# Patient Record
Sex: FEMALE | Race: WHITE | NOT HISPANIC OR LATINO | URBAN - METROPOLITAN AREA
[De-identification: names, ages, dates, MRNs, and addresses within clinical notes are randomized per-mention and may not be internally consistent; named-entity substitution may affect disease eponyms.]

---

## 2020-04-05 ENCOUNTER — EMERGENCY (EMERGENCY)
Facility: HOSPITAL | Age: 65
LOS: 1 days | Discharge: HOME | End: 2020-04-05
Attending: EMERGENCY MEDICINE
Payer: MEDICARE

## 2020-04-05 VITALS
WEIGHT: 199.96 LBS | HEART RATE: 79 BPM | HEIGHT: 65 IN | RESPIRATION RATE: 22 BRPM | TEMPERATURE: 98 F | SYSTOLIC BLOOD PRESSURE: 174 MMHG | DIASTOLIC BLOOD PRESSURE: 73 MMHG | OXYGEN SATURATION: 98 %

## 2020-04-05 PROCEDURE — 99284 EMERGENCY DEPT VISIT MOD MDM: CPT | Mod: GC

## 2020-04-06 VITALS
RESPIRATION RATE: 20 BRPM | HEART RATE: 62 BPM | DIASTOLIC BLOOD PRESSURE: 67 MMHG | SYSTOLIC BLOOD PRESSURE: 100 MMHG | TEMPERATURE: 98 F | OXYGEN SATURATION: 96 %

## 2020-04-06 LAB
ALBUMIN SERPL ELPH-MCNC: 4.1 G/DL — SIGNIFICANT CHANGE UP (ref 3.3–5)
ALP SERPL-CCNC: 80 U/L — SIGNIFICANT CHANGE UP (ref 40–120)
ALT FLD-CCNC: 21 U/L — SIGNIFICANT CHANGE UP (ref 10–45)
ANION GAP SERPL CALC-SCNC: 15 MMOL/L — SIGNIFICANT CHANGE UP (ref 5–17)
AST SERPL-CCNC: 23 U/L — SIGNIFICANT CHANGE UP (ref 10–40)
BASOPHILS # BLD AUTO: 0.02 K/UL — SIGNIFICANT CHANGE UP (ref 0–0.2)
BASOPHILS NFR BLD AUTO: 0.2 % — SIGNIFICANT CHANGE UP (ref 0–2)
BILIRUB SERPL-MCNC: 0.3 MG/DL — SIGNIFICANT CHANGE UP (ref 0.2–1.2)
BUN SERPL-MCNC: 12 MG/DL — SIGNIFICANT CHANGE UP (ref 7–23)
CALCIUM SERPL-MCNC: 9.2 MG/DL — SIGNIFICANT CHANGE UP (ref 8.4–10.5)
CHLORIDE SERPL-SCNC: 98 MMOL/L — SIGNIFICANT CHANGE UP (ref 96–108)
CO2 SERPL-SCNC: 25 MMOL/L — SIGNIFICANT CHANGE UP (ref 22–31)
CREAT SERPL-MCNC: 0.73 MG/DL — SIGNIFICANT CHANGE UP (ref 0.5–1.3)
EOSINOPHIL # BLD AUTO: 0.03 K/UL — SIGNIFICANT CHANGE UP (ref 0–0.5)
EOSINOPHIL NFR BLD AUTO: 0.4 % — SIGNIFICANT CHANGE UP (ref 0–6)
GLUCOSE SERPL-MCNC: 116 MG/DL — HIGH (ref 70–99)
HCT VFR BLD CALC: 45.5 % — HIGH (ref 34.5–45)
HGB BLD-MCNC: 14.5 G/DL — SIGNIFICANT CHANGE UP (ref 11.5–15.5)
IMM GRANULOCYTES NFR BLD AUTO: 0.4 % — SIGNIFICANT CHANGE UP (ref 0–1.5)
LIDOCAIN IGE QN: 29 U/L — SIGNIFICANT CHANGE UP (ref 7–60)
LYMPHOCYTES # BLD AUTO: 1.98 K/UL — SIGNIFICANT CHANGE UP (ref 1–3.3)
LYMPHOCYTES # BLD AUTO: 24.5 % — SIGNIFICANT CHANGE UP (ref 13–44)
MAGNESIUM SERPL-MCNC: 2.1 MG/DL — SIGNIFICANT CHANGE UP (ref 1.6–2.6)
MCHC RBC-ENTMCNC: 27.6 PG — SIGNIFICANT CHANGE UP (ref 27–34)
MCHC RBC-ENTMCNC: 31.9 GM/DL — LOW (ref 32–36)
MCV RBC AUTO: 86.5 FL — SIGNIFICANT CHANGE UP (ref 80–100)
MONOCYTES # BLD AUTO: 0.48 K/UL — SIGNIFICANT CHANGE UP (ref 0–0.9)
MONOCYTES NFR BLD AUTO: 5.9 % — SIGNIFICANT CHANGE UP (ref 2–14)
NEUTROPHILS # BLD AUTO: 5.55 K/UL — SIGNIFICANT CHANGE UP (ref 1.8–7.4)
NEUTROPHILS NFR BLD AUTO: 68.6 % — SIGNIFICANT CHANGE UP (ref 43–77)
NRBC # BLD: 0 /100 WBCS — SIGNIFICANT CHANGE UP (ref 0–0)
PLATELET # BLD AUTO: 183 K/UL — SIGNIFICANT CHANGE UP (ref 150–400)
POTASSIUM SERPL-MCNC: 3.8 MMOL/L — SIGNIFICANT CHANGE UP (ref 3.5–5.3)
POTASSIUM SERPL-SCNC: 3.8 MMOL/L — SIGNIFICANT CHANGE UP (ref 3.5–5.3)
PROT SERPL-MCNC: 7.3 G/DL — SIGNIFICANT CHANGE UP (ref 6–8.3)
RBC # BLD: 5.26 M/UL — HIGH (ref 3.8–5.2)
RBC # FLD: 12.5 % — SIGNIFICANT CHANGE UP (ref 10.3–14.5)
SODIUM SERPL-SCNC: 138 MMOL/L — SIGNIFICANT CHANGE UP (ref 135–145)
WBC # BLD: 8.09 K/UL — SIGNIFICANT CHANGE UP (ref 3.8–10.5)
WBC # FLD AUTO: 8.09 K/UL — SIGNIFICANT CHANGE UP (ref 3.8–10.5)

## 2020-04-06 PROCEDURE — 83735 ASSAY OF MAGNESIUM: CPT

## 2020-04-06 PROCEDURE — 71045 X-RAY EXAM CHEST 1 VIEW: CPT

## 2020-04-06 PROCEDURE — 80053 COMPREHEN METABOLIC PANEL: CPT

## 2020-04-06 PROCEDURE — 93005 ELECTROCARDIOGRAM TRACING: CPT

## 2020-04-06 PROCEDURE — 85027 COMPLETE CBC AUTOMATED: CPT

## 2020-04-06 PROCEDURE — 71045 X-RAY EXAM CHEST 1 VIEW: CPT | Mod: 26

## 2020-04-06 PROCEDURE — 99283 EMERGENCY DEPT VISIT LOW MDM: CPT | Mod: 25

## 2020-04-06 PROCEDURE — 83690 ASSAY OF LIPASE: CPT

## 2020-04-06 RX ORDER — ONDANSETRON 8 MG/1
1 TABLET, FILM COATED ORAL
Qty: 15 | Refills: 0
Start: 2020-04-06 | End: 2020-04-10

## 2020-04-06 RX ADMIN — Medication 100 MILLIGRAM(S): at 00:55

## 2020-04-06 NOTE — ED PROVIDER NOTE - OBJECTIVE STATEMENT
64y f PMhx HTN, HLD, preDM not on meds, p/w cough and SOB for the last 6 days. Pt's  tested positive for COVID19 10 days ago. Pt has finished a course of azithromycin, and was started on cefdenir yesterday. Pt has been checking pulse ox at home, states today it went as low as 91% while she was lying on her back , but when sitting up is around 94% on average. Pt has been taking tylenol for fevers and body aches at home.

## 2020-04-06 NOTE — ED PROVIDER NOTE - NSFOLLOWUPINSTRUCTIONS_ED_ALL_ED_FT
YOU VERY LIKELY HAVE COVID19 INFECTION; HOWEVER, YOUR OXYGEN SATURATION IS STILL OK, THERE IS NO INDICATION FOR ADMISSION TO THE HOSPITAL AT THIS TIME    RETURN TO THE HOSPITAL FOR WORSENING SHORTNESS OF BREATH, DIZZINESS WHILE WALKING, FAINTING, OR FEELING LIKE YOU CANNOT BREATHE    PLEASE CONTINUE TAKING TYLENOL AND IBUPROFEN FOR BODY ACHES AND FEVER    PLEASE FOLLOW UP WITH YOUR PRIMARY CARE PHYSICIAN    WE ARE SENDING MEDICATION TO YOUR PHARMACY TO HELP WITH COUGH AND NAUSEA

## 2020-04-06 NOTE — ED ADULT NURSE NOTE - OBJECTIVE STATEMENT
Patient is a 64 year old female complaining of sob. Patient has history of htn, hld, pre diabetic . Patient is A&O x 4. Pt reports fever, body pain, nausea, cough. pt states her  was tested positive for covid.  Denies complaints of (chest pain, headache, syncope, burning urination, blood in urine, blood in stool. Abd is soft, tender to touch, non distended. Skin is warm and dry. Color is consistent with ethnicity. Safety and comfort maintained. Will continue to monitor.

## 2020-04-06 NOTE — ED ADULT NURSE NOTE - NSIMPLEMENTINTERV_GEN_ALL_ED
Implemented All Universal Safety Interventions:  [place] to call system. Call bell, personal items and telephone within reach. Instruct patient to call for assistance. Room bathroom lighting operational. Non-slip footwear when patient is off stretcher. Physically safe environment: no spills, clutter or unnecessary equipment. Stretcher in lowest position, wheels locked, appropriate side rails in place.

## 2020-04-06 NOTE — ED PROVIDER NOTE - ATTENDING CONTRIBUTION TO CARE
Attending MD Emily May:  I personally have seen and examined this patient.  Resident note reviewed and agree on plan of care and except where noted.  See HPI, PE, and MDM for details.

## 2020-04-06 NOTE — ED PROVIDER NOTE - PHYSICAL EXAMINATION
Attending Emily May: Gen: NAD, heent: atrauamtic, eomi, perrla, mmm, op pink, uvula midline, neck; nttp, no nuchal rigidity, chest: nttp, no crepitus, cv: rrr, no murmurs, lungs: ctab, abd: soft,mild ttp lower abdmoen, no peritoneal signs, +BS, no guarding, ext: wwp, neg homans, skin: no rash, neuro: awake and alert, following commands, speech clear, sensation and strength intact, no focal deficits

## 2020-04-06 NOTE — ED PROVIDER NOTE - PROGRESS NOTE DETAILS
Attending Emily May: pt feeling better. on repeat exam abd soft and nontender. Attending Emily May: on repeat exam ptstates feels better. abdomen soft. ambulating witbout difficulty. given close return precautons will dc

## 2020-04-06 NOTE — ED PROVIDER NOTE - PATIENT PORTAL LINK FT
You can access the FollowMyHealth Patient Portal offered by [facility] by registering at the following website: [email or web address]. By joining [employer]’s FollowMyHealth portal, you will also be able to view your health information using other applications (apps) compatible with our system.

## 2020-04-06 NOTE — ED PROVIDER NOTE - CLINICAL SUMMARY MEDICAL DECISION MAKING FREE TEXT BOX
Pt p/w cough and SOB for 6 days, pt w/ exposure to known COVID (+) pt (), pt O2 95% on RA, maintained even while walking. Pt appears fatigued from coughing but otherwise well, will sent antitussive medication and antiemetics (pt vomited once today) to pharmacy, obtain CXR, and dc pt w/out further w/u and return precautions -[facility] Pt p/w cough and SOB for 6 days, pt w/ exposure to known COVID (+) pt (), pt O2 95% on RA, maintained even while walking. Pt appears fatigued from coughing but otherwise well, mild abd pain likely 2/2 GI distress from COVID infx, nonfocal in nature, will sent antitussive medication and antiemetics (pt vomited once today) to pharmacy, obtain CXR, and dc pt w/out further w/u and return precautions -[facility] Pt p/w cough and SOB for 6 days, pt w/ exposure to known COVID (+) pt (), pt O2 95% on RA, maintained even while walking. Pt appears fatigued from coughing but otherwise well, mild abd pain likely 2/2 GI distress from COVID infx, nonfocal in nature, will sent antitussive medication and antiemetics (pt vomited once today) to pharmacy, obtain CXR, and dc pt w/out further w/u and return precautions -[facility]  Attending Emily May: 65 y/o female presentng with cough, weakness  with known covid exposure. upon arrival pt saturating 95% without increased wob. does have mild lower abd ttp. on serial exams abd soft and nontender. suspect diarrhea from covid. less likely colitis. pt ambulating without difficulty or hypoxia. will d/c with close return precautions

## 2022-07-26 PROBLEM — Z00.00 ENCOUNTER FOR PREVENTIVE HEALTH EXAMINATION: Status: ACTIVE | Noted: 2022-07-26

## 2022-07-28 ENCOUNTER — APPOINTMENT (OUTPATIENT)
Dept: ORTHOPEDIC SURGERY | Facility: CLINIC | Age: 67
End: 2022-07-28

## 2022-07-28 VITALS
BODY MASS INDEX: 34.66 KG/M2 | SYSTOLIC BLOOD PRESSURE: 148 MMHG | HEART RATE: 67 BPM | WEIGHT: 208 LBS | HEIGHT: 65 IN | DIASTOLIC BLOOD PRESSURE: 83 MMHG

## 2022-07-28 DIAGNOSIS — Z78.9 OTHER SPECIFIED HEALTH STATUS: ICD-10-CM

## 2022-07-28 DIAGNOSIS — M25.531 PAIN IN RIGHT WRIST: ICD-10-CM

## 2022-07-28 PROCEDURE — 99203 OFFICE O/P NEW LOW 30 MIN: CPT

## 2022-07-30 PROBLEM — Z78.9 CURRENT NON-DRINKER OF ALCOHOL: Status: ACTIVE | Noted: 2022-07-28

## 2022-07-30 PROBLEM — Z78.9 DOES NOT USE ILLICIT DRUGS: Status: ACTIVE | Noted: 2022-07-28

## 2022-07-30 PROBLEM — Z78.9 CURRENT NON-SMOKER: Status: ACTIVE | Noted: 2022-07-28

## 2022-07-30 RX ORDER — CHLORTHALIDONE 25 MG/1
25 TABLET ORAL
Qty: 90 | Refills: 0 | Status: ACTIVE | COMMUNITY
Start: 2022-04-04

## 2022-07-30 RX ORDER — RAMIPRIL 5 MG/1
5 CAPSULE ORAL
Qty: 90 | Refills: 0 | Status: ACTIVE | COMMUNITY
Start: 2022-04-04

## 2022-07-30 RX ORDER — ALBUTEROL SULFATE 90 UG/1
108 (90 BASE) INHALANT RESPIRATORY (INHALATION)
Qty: 8 | Refills: 0 | Status: ACTIVE | COMMUNITY
Start: 2022-07-19

## 2022-07-30 RX ORDER — ATOVAQUONE AND PROGUANIL HYDROCHLORIDE 250; 100 MG/1; MG/1
250-100 TABLET, FILM COATED ORAL
Qty: 22 | Refills: 0 | Status: ACTIVE | COMMUNITY
Start: 2022-07-25

## 2022-07-30 RX ORDER — AZITHROMYCIN 500 MG/1
500 TABLET, FILM COATED ORAL
Qty: 3 | Refills: 0 | Status: ACTIVE | COMMUNITY
Start: 2022-07-25

## 2022-07-30 RX ORDER — BENZONATATE 100 MG/1
100 CAPSULE ORAL
Qty: 21 | Refills: 0 | Status: ACTIVE | COMMUNITY
Start: 2022-07-19

## 2022-07-30 RX ORDER — ATORVASTATIN CALCIUM 20 MG/1
20 TABLET, FILM COATED ORAL
Qty: 90 | Refills: 0 | Status: ACTIVE | COMMUNITY
Start: 2022-04-04

## 2022-07-30 RX ORDER — DOXYCLYCLINE HYCLATE 150 MG/1
TABLET, COATED ORAL
Refills: 0 | Status: ACTIVE | COMMUNITY

## 2022-07-30 NOTE — PATIENT INSTRUCTIONS
[FreeTextEntry1] : HAND SURGERY PATIENTS\par Instructions: Trigger finger, CTR, de Quervain's, etc. \par \par 1. Maintain hand elevation for 24 to 48 hours. Try to keep your hand higher than your elbow, relative to the floor.  Elevation is one of the best ways to control pain. Swelling usually peaks during this period. After 48 hours, you do not need to maintain elevation if there is no "throbbing" when your hand is lowered. NOTE: Your hand does not have to be elevated continuously. \par \par 2. Bathing: Keep your dressing dry. You may wash exposed fingers with a washcloth. You may shower or bathe with a plastic bag protecting the dressing/cast. Once you've changed the dressing, you should wash or bathe with a disposable glove over the wound.\par \par 3. Dressing:  Start to change your dressing two days after surgery (unless told not to). Apply a 3" x 3" or 2" x 2" gauze pad secured with 2 inch generic Coban or 2" Ace bandage. Try to avoid tape, because it leaves adhesive residue on the skin.  Change bandage as needed. You can remove the bandage and use your hand for Activities of Daily Living (ADL) with no dressing, if there is no bleeding, when you are inside the house. Once you change the bandage, you must reapply a new clean dressing at least once every day.\par \par 4. Exercise: It is important to move your fingers, shoulder, and elbow to prevent stiffness. Fully opening and closing your fingers several times per day to maintain full range of motion is essential. When you can easily open and close your fingers fully without pain, you can exercise only 3 to 4 times each day, even when movement becomes easy and painless to prevent finger stiffness. You may perform simple activities under 2 to 3 pounds, e.g., holding a phone, writing, simple keyboarding, using eating utensils. You may drive if you feel fully competent and safe controling the vehicle.\par \par 5. Pain: Numbness from the nerve block (local anesthesia) usually lasts 4-8 hours, but sometimes lasts more than 24 hours. Once pain starts, take pain medicine as prescribed. Remember, elevation is one of the best ways to control pain. Pain is normal during and following exercise periods. If necessary, take the prescribed medicine. Ask if you can substitute Tylenol, Advil, or Aleve. Note: You must not take more than 4000mg of Tylenol in 24 hours.\par \par 6. Bleeding: Blood staining on your dressing is very common, as is the appearance of "black and blue on exposed fingers or arm. Black and blue discoloration is expected in and around the surgical area.\par \par 7. Swelling: Some finger swelling usually occurs. Exercises and elevation will help control swelling. Ask if you may loosen the dressing. It is important to verify that you may do this.\par \par 8. Infection: Post operative infections are rare. If you get PROGRESSIVE redness, swelling, and pain for more than 24 hour that does not respond to elevation and rest, or if you start to run a fever, call the office: [phone].\par \par 9. Call to schedule a follow up appointment, even if you read this prior to surgery. Arrange follow up approximately one week post operatively, or at the time recommended by your surgeon. \par \par Thank you.\par Daniel Cai MD\par \par \par \par

## 2022-07-30 NOTE — DISCUSSION/SUMMARY
[FreeTextEntry1] : Patient has history and physical findings consistent with bilateral carpal tunnel syndrome.  Electrically much worse on right than left with nonreactive sensory conduction.  Patient states that she has constant numbness on the right with occasional exacerbations.  Because of constant symptoms of numbness and tingling and nonreactive sensory conduction, the likelihood for permanent resolution with cortisone injection or nonoperative treatment is low.  In fact, I have explained to patient that because of the constant symptoms and nonreactive sensory conduction there is a fairly high possibility that the numbness and tingling will continue to some degree postoperatively.  There is a possibility that symptoms may never fully subside.  Patient states that she has relatively infrequent exacerbations.  I explained that often this is a poor prognostic indicator suggesting a more severe carpal tunnel syndrome.  It is possible that the exacerbations may subside but this cannot be assured.\par Patient reports that the left hand is less symptomatic.  Carpal tunnel cortisone injection can be ordered at the time of surgery, if the patient wishes.\par The the range of treatment options risks and complications reviewed and discussed.  Patient wishes to proceed with surgery.\par \par Surgery, carpal tunnel release, for right carpal tunnel syndrome is indicated because of symptoms refractory to conservative treatment, interfering with sleep, and activities of daily living. Because of the duration and severity of carpal tunnel syndrome, ongoing symptoms can be expected postoperatively. While the patient may see improvement, there is no assurance of this. The possibility of little improvement or of no improvement exists. Even though it is low, the possibility of worsening exists as well. Risk of injury to the median nerve, CRPS, persistent paresthesias, wound related pain, weakness, and many other factors, reviewed and discussed.\par A lengthy and detailed discussion has been held with the patient. The surgical plan, alternative treatments, and the associated risks, complications, limitations, and expectations have been discussed with the patient. Postoperative plan, need for exercising to regain motion and function, wound care, dressing care, activities, follow up, and possible need for hand therapy have been reviewed and discussed. In addition, the expectation of postop wound related pain, wound induration, swelling, weakness of , alteration of hand and finger use and function, and palmar and forearm tenderness were reviewed. In particular, the expectation of weakness, difficulty with daily activities, and wound induration often lasting six months have been stressed. \par All questions have been answered. The patient has expressed understanding and acceptance of the above and has consented to surgery.

## 2022-07-30 NOTE — PHYSICAL EXAM
[de-identified] : Neurologic:\par Right altered sensation median n distribution at rest\par Phalen's test with median nerve compression: no change\par thenar atrophy\par Ulnar innervated thenar: intact\par Radial nerve motor and sensory and ulnar nerve motor and sensory are intact.\par \par Left hand\par Normal sensation at rest all digits\par Phalen's test with median nerve compression: Negative\par Good thenar tone and bulk\par \par Right wrist\par Full motion\par tenderness TFCC, slight.\par U-C abutment test: minimal symptoms\par \par Right hand\par No pertinent CMC, MP, PIP, or DIP joint contributory finding.\par No A1 pulley tenderness and no triggering in any finger.

## 2022-07-30 NOTE — HISTORY OF PRESENT ILLNESS
[FreeTextEntry1] : The patient is 67 yo RHD female who presents for hand evaluation.\par Patient provides history August 2021, fell [place] a [place], and landed on right hand/wrist.\par Initial x-rays "showed nothing"\par Pt saw Dr. Blevins, [facility].\par Patient developed numbness in fingers of right hand\par Pt sent for therapy.\par \par Approximately 2 to 3 weeks after the injury the patient started to notice numbness and tingling in the right hand\par Patient reports symptoms are more or less constant not particularly worse day or night\par Only occasionally the symptoms may become more such as sleeping on the forearm or recently when working for many hours doing a home project\par Writing for prolonged period of time will exacerbate symptoms\par Patient reports that the constant numbness and tingling and exacerbations are problematic and interfere with function.\par Patient states that she would like to have treatment to correct the numbness and tingling.\par Pt went to neurologist, Corey Renae MD, for NCS/EMG\par Electrodiagnostic studies performed 4/26/2022 showed right median sensory "NR" on the right.  Left median palmar latency 2.5 ms but otherwise normal.\par Right median motor latency across the wrist 4.85 ms, left 3.55 ms.\par EMG positive in both right and left ADM; \par normal in bilateral APB muscles.\par Study interpreted as showing moderate right and mild left median neuropathy.  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

## 2022-09-22 ENCOUNTER — APPOINTMENT (OUTPATIENT)
Dept: ORTHOPEDIC SURGERY | Facility: CLINIC | Age: 67
End: 2022-09-22

## 2023-09-22 ENCOUNTER — NON-APPOINTMENT (OUTPATIENT)
Age: 68
End: 2023-09-22

## 2023-09-22 ENCOUNTER — APPOINTMENT (OUTPATIENT)
Dept: ORTHOPEDIC SURGERY | Facility: CLINIC | Age: 68
End: 2023-09-22
Payer: MEDICARE

## 2023-09-22 VITALS
DIASTOLIC BLOOD PRESSURE: 77 MMHG | BODY MASS INDEX: 31.32 KG/M2 | SYSTOLIC BLOOD PRESSURE: 136 MMHG | WEIGHT: 188 LBS | HEART RATE: 80 BPM | HEIGHT: 65 IN

## 2023-09-22 PROCEDURE — 99215 OFFICE O/P EST HI 40 MIN: CPT

## 2024-01-30 ENCOUNTER — OUTPATIENT (OUTPATIENT)
Dept: OUTPATIENT SERVICES | Facility: HOSPITAL | Age: 69
LOS: 1 days | End: 2024-01-30
Payer: MEDICARE

## 2024-01-30 VITALS — WEIGHT: 194.23 LBS | HEIGHT: 63 IN

## 2024-01-30 DIAGNOSIS — Z01.818 ENCOUNTER FOR OTHER PREPROCEDURAL EXAMINATION: ICD-10-CM

## 2024-01-30 DIAGNOSIS — G56.01 CARPAL TUNNEL SYNDROME, RIGHT UPPER LIMB: ICD-10-CM

## 2024-01-30 DIAGNOSIS — Z98.890 OTHER SPECIFIED POSTPROCEDURAL STATES: Chronic | ICD-10-CM

## 2024-01-30 LAB
ANION GAP SERPL CALC-SCNC: 7 MMOL/L — SIGNIFICANT CHANGE UP (ref 5–17)
BUN SERPL-MCNC: 21 MG/DL — SIGNIFICANT CHANGE UP (ref 7–23)
CALCIUM SERPL-MCNC: 9.6 MG/DL — SIGNIFICANT CHANGE UP (ref 8.4–10.5)
CHLORIDE SERPL-SCNC: 103 MMOL/L — SIGNIFICANT CHANGE UP (ref 96–108)
CO2 SERPL-SCNC: 30 MMOL/L — SIGNIFICANT CHANGE UP (ref 22–31)
CREAT SERPL-MCNC: 0.97 MG/DL — SIGNIFICANT CHANGE UP (ref 0.5–1.3)
EGFR: 63 ML/MIN/1.73M2 — SIGNIFICANT CHANGE UP
GLUCOSE SERPL-MCNC: 126 MG/DL — HIGH (ref 70–99)
POTASSIUM SERPL-MCNC: 4.1 MMOL/L — SIGNIFICANT CHANGE UP (ref 3.5–5.3)
POTASSIUM SERPL-SCNC: 4.1 MMOL/L — SIGNIFICANT CHANGE UP (ref 3.5–5.3)
SODIUM SERPL-SCNC: 140 MMOL/L — SIGNIFICANT CHANGE UP (ref 135–145)

## 2024-01-30 PROCEDURE — G0463: CPT

## 2024-01-30 PROCEDURE — 36415 COLL VENOUS BLD VENIPUNCTURE: CPT

## 2024-01-30 PROCEDURE — 83036 HEMOGLOBIN GLYCOSYLATED A1C: CPT

## 2024-01-30 PROCEDURE — 80048 BASIC METABOLIC PNL TOTAL CA: CPT

## 2024-01-30 NOTE — H&P PST ADULT - COMMENTS
history of covid March 2020 severe flu like symptoms and hospitalized for 1 night but not intubated and December 2023, asymptomatic and no treatment.  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

## 2024-01-30 NOTE — H&P PST ADULT - NSICDXFAMILYHX_GEN_ALL_CORE_FT
FAMILY HISTORY:  Father  Still living? No  Family history of hypertension, Age at diagnosis: Age Unknown  Family history of stroke, Age at diagnosis: Age Unknown    Mother  Still living? No  Family history of heart attack, Age at diagnosis: Age Unknown  Family history of thalassemia, Age at diagnosis: Age Unknown    Sibling  Still living? Yes, Estimated age: 71-80  Family history of thalassemia, Age at diagnosis: Age Unknown  Family history of stroke, Age at diagnosis: Age Unknown  FHx: dementia, Age at diagnosis: Age Unknown

## 2024-01-30 NOTE — H&P PST ADULT - NSANTHOSAYNRD_GEN_A_CORE
neck/No. LAWRENCE screening performed.  STOP BANG Legend: 0-2 = LOW Risk; 3-4 = INTERMEDIATE Risk; 5-8 = HIGH Risk

## 2024-01-30 NOTE — H&P PST ADULT - HISTORY OF PRESENT ILLNESS
67 yo female presents s/p fall 2 years ago and in an attempt to break her fall extended her arms and since then has had constant right thumb, index and middle finger numbness. Denies pain. She has been treated with PT, chiropractic and cortisone injections with no relief. Denies change in strength.

## 2024-01-30 NOTE — H&P PST ADULT - PROBLEM SELECTOR PLAN 1
endoscopic right carpal tunnel release. medical and hematology clearances requested. surgical wash instructions reviewed and verbalized understanding. instructed to stop CoQ10, biotin, turmeric, fish oil, chromium piccolinate and quercitin 1 week prior to surgery

## 2024-01-31 LAB
A1C WITH ESTIMATED AVERAGE GLUCOSE RESULT: 5.6 % — SIGNIFICANT CHANGE UP (ref 4–5.6)
ESTIMATED AVERAGE GLUCOSE: 114 MG/DL — SIGNIFICANT CHANGE UP (ref 68–114)

## 2024-02-05 ENCOUNTER — NON-APPOINTMENT (OUTPATIENT)
Age: 69
End: 2024-02-05

## 2024-02-12 ENCOUNTER — TRANSCRIPTION ENCOUNTER (OUTPATIENT)
Age: 69
End: 2024-02-12

## 2024-02-13 ENCOUNTER — OUTPATIENT (OUTPATIENT)
Dept: OUTPATIENT SERVICES | Facility: HOSPITAL | Age: 69
LOS: 1 days | End: 2024-02-13
Payer: MEDICARE

## 2024-02-13 ENCOUNTER — TRANSCRIPTION ENCOUNTER (OUTPATIENT)
Age: 69
End: 2024-02-13

## 2024-02-13 ENCOUNTER — RESULT REVIEW (OUTPATIENT)
Age: 69
End: 2024-02-13

## 2024-02-13 ENCOUNTER — APPOINTMENT (OUTPATIENT)
Dept: ORTHOPEDIC SURGERY | Facility: HOSPITAL | Age: 69
End: 2024-02-13

## 2024-02-13 VITALS
WEIGHT: 189.6 LBS | DIASTOLIC BLOOD PRESSURE: 64 MMHG | HEART RATE: 69 BPM | TEMPERATURE: 98 F | RESPIRATION RATE: 16 BRPM | HEIGHT: 64 IN | SYSTOLIC BLOOD PRESSURE: 144 MMHG | OXYGEN SATURATION: 99 %

## 2024-02-13 VITALS
OXYGEN SATURATION: 99 % | DIASTOLIC BLOOD PRESSURE: 67 MMHG | TEMPERATURE: 98 F | HEART RATE: 61 BPM | SYSTOLIC BLOOD PRESSURE: 125 MMHG | RESPIRATION RATE: 13 BRPM

## 2024-02-13 DIAGNOSIS — Z01.818 ENCOUNTER FOR OTHER PREPROCEDURAL EXAMINATION: ICD-10-CM

## 2024-02-13 DIAGNOSIS — Z98.890 OTHER SPECIFIED POSTPROCEDURAL STATES: Chronic | ICD-10-CM

## 2024-02-13 DIAGNOSIS — G56.01 CARPAL TUNNEL SYNDROME, RIGHT UPPER LIMB: ICD-10-CM

## 2024-02-13 PROCEDURE — 88304 TISSUE EXAM BY PATHOLOGIST: CPT

## 2024-02-13 PROCEDURE — 88304 TISSUE EXAM BY PATHOLOGIST: CPT | Mod: 26

## 2024-02-13 PROCEDURE — 26145 TENDON EXCISION PALM/FINGER: CPT | Mod: RT

## 2024-02-13 PROCEDURE — 29848 WRIST ENDOSCOPY/SURGERY: CPT | Mod: RT

## 2024-02-13 PROCEDURE — 25115 REMOVE WRIST/FOREARM LESION: CPT | Mod: RT

## 2024-02-13 RX ORDER — RAMIPRIL 5 MG
1 CAPSULE ORAL
Refills: 0 | DISCHARGE

## 2024-02-13 RX ORDER — MILK THISTLE 150 MG
1 CAPSULE ORAL
Refills: 0 | DISCHARGE

## 2024-02-13 RX ORDER — ONDANSETRON 8 MG/1
4 TABLET, FILM COATED ORAL ONCE
Refills: 0 | Status: DISCONTINUED | OUTPATIENT
Start: 2024-02-13 | End: 2024-02-13

## 2024-02-13 RX ORDER — ATORVASTATIN CALCIUM 80 MG/1
1 TABLET, FILM COATED ORAL
Refills: 0 | DISCHARGE

## 2024-02-13 RX ORDER — IBUPROFEN 200 MG
1 TABLET ORAL
Qty: 10 | Refills: 0
Start: 2024-02-13

## 2024-02-13 RX ORDER — SODIUM CHLORIDE 9 MG/ML
1000 INJECTION, SOLUTION INTRAVENOUS
Refills: 0 | Status: DISCONTINUED | OUTPATIENT
Start: 2024-02-13 | End: 2024-02-13

## 2024-02-13 RX ORDER — CHROMIUM PICOLINATE 200 MCG
1 TABLET ORAL
Refills: 0 | DISCHARGE

## 2024-02-13 RX ORDER — CINNAMON BARK 500 MG
4 CAPSULE ORAL
Refills: 0 | DISCHARGE

## 2024-02-13 RX ORDER — LUTEIN 20 MG
2 CAPSULE ORAL
Refills: 0 | DISCHARGE

## 2024-02-13 RX ORDER — UBIDECARENONE 100 MG
1 CAPSULE ORAL
Refills: 0 | DISCHARGE

## 2024-02-13 RX ORDER — OXYCODONE HYDROCHLORIDE 5 MG/1
5 TABLET ORAL ONCE
Refills: 0 | Status: DISCONTINUED | OUTPATIENT
Start: 2024-02-13 | End: 2024-02-13

## 2024-02-13 RX ORDER — CHOLECALCIFEROL (VITAMIN D3) 125 MCG
1 CAPSULE ORAL
Refills: 0 | DISCHARGE

## 2024-02-13 RX ORDER — APREPITANT 80 MG/1
40 CAPSULE ORAL ONCE
Refills: 0 | Status: COMPLETED | OUTPATIENT
Start: 2024-02-13 | End: 2024-02-13

## 2024-02-13 RX ORDER — HYDROMORPHONE HYDROCHLORIDE 2 MG/ML
0.5 INJECTION INTRAMUSCULAR; INTRAVENOUS; SUBCUTANEOUS
Refills: 0 | Status: DISCONTINUED | OUTPATIENT
Start: 2024-02-13 | End: 2024-02-13

## 2024-02-13 RX ORDER — CEFAZOLIN SODIUM 1 G
2000 VIAL (EA) INJECTION ONCE
Refills: 0 | Status: COMPLETED | OUTPATIENT
Start: 2024-02-13 | End: 2024-02-13

## 2024-02-13 RX ORDER — ZINC ACETATE DIHYDRATE 100 %
1 CRYSTALS MISCELLANEOUS
Refills: 0 | DISCHARGE

## 2024-02-13 RX ORDER — OMEGA-3 ACID ETHYL ESTERS 1 G
0 CAPSULE ORAL
Refills: 0 | DISCHARGE

## 2024-02-13 RX ORDER — ASPIRIN/CALCIUM CARB/MAGNESIUM 324 MG
0 TABLET ORAL
Refills: 0 | DISCHARGE

## 2024-02-13 RX ORDER — ASCORBIC ACID 60 MG
1 TABLET,CHEWABLE ORAL
Refills: 0 | DISCHARGE

## 2024-02-13 RX ADMIN — APREPITANT 40 MILLIGRAM(S): 80 CAPSULE ORAL at 10:33

## 2024-02-13 RX ADMIN — SODIUM CHLORIDE 75 MILLILITER(S): 9 INJECTION, SOLUTION INTRAVENOUS at 12:42

## 2024-02-13 NOTE — BRIEF OPERATIVE NOTE - NSICDXBRIEFPOSTOP_GEN_ALL_CORE_FT
POST-OP DIAGNOSIS:  Right carpal tunnel syndrome 13-Feb-2024 12:28:39  Theodore Augustine  Tenosynovitis, wrist 13-Feb-2024 12:28:51 right Theodore Augustine

## 2024-02-13 NOTE — ASU DISCHARGE PLAN (ADULT/PEDIATRIC) - CARE PROVIDER_API CALL
Bubba Esquivel)  Surgery of the Hand  [address], [address]  [place] [address]  Phone: [phone]  Fax: [phone]  Follow Up Time:

## 2024-02-13 NOTE — ASU DISCHARGE PLAN (ADULT/PEDIATRIC) - ASU DC SPECIAL INSTRUCTIONSFT
Elevate right arm in sling daily when up & walking.  Elevate the  hand/arm above heart level on pillow/blankets when lying down.  Pad the neck strap with athletic sock/collared shirt.  Apply ice packs to top of right hand for 30 minutes every 3 hrs daily.  Keep bandage clean, dry , & intact.  Call the Dr.  for fever, severe pain.  Call for an appointment for office visit  in the [place] office on Friday 2/16/24.

## 2024-02-13 NOTE — BRIEF OPERATIVE NOTE - NSICDXBRIEFPROCEDURE_GEN_ALL_CORE_FT
PROCEDURES:  Endoscopic carpal tunnel release of right wrist 13-Feb-2024 12:27:45  Theodore Augustine  Tenosynovectomy of wrist 13-Feb-2024 12:27:59 right Theodore Augustine O

## 2024-02-13 NOTE — PRE-OP CHECKLIST - TEMPERATURE IN FAHRENHEIT (DEGREES F)
[place], [address]  Phone: [phone]   Fax: [phone]    Physical Therapy Treatment Note/ Progress Report:     Date:  2022    Patient Name:  Rema Patel    :  1952  MRN: [id number]  Restrictions/Precautions:    Medical/Treatment Diagnosis Information:  Diagnosis: M17.12 (ICD-10-CM) - Primary osteoarthritis of left kneeM25.561, M25.562 (ICD-10-CM) - Pain in both knees, unspecified [id number] (ICD-10-CM) - Primary osteoarthritis of right knee  Treatment Diagnosis: Decreased B pain-free Knee ROM with decreased functional strength limiting functional gait/stairs  Insurance/Certification information:  PT Insurance Information: A[employer]; $40 copay; no auth  Physician Information:  Referring Practitioner: Dr. Lopez [facility] of care signed (Y/N): []  Yes [x]  No     Date of Patient follow up with Physician:      Progress Report: [x]  Yes Eval  []  No     Date Range for reporting period:  Beginnin2022  Ending:    Progress report due (10 Rx/or 30 days whichever is less): visit #42 or     Recertification due (POC duration/ or 90 days whichever is less): visit #12 or     Visit # Insurance Allowable Auth required?  Date Range   1 A[employer] Medicare  $40 copay  BMN []  Yes  [x]  No NA  No ionto, aquatics, DN, estim       Units approved Units used Date Range          Latex Allergy:  [x]NO      []YES  Preferred Language for Healthcare:   [x]English       []other:    Functional Scale:           Date assessed:  FOTO physical FS primary measure score = 40; risk adjusted = 52      Pain level: 6/10/10     SUBJECTIVE:  See eval    OBJECTIVE: See eval      RESTRICTIONS/PRECAUTIONS:     Exercises/Interventions:     Therapeutic Exercise (85362)  Resistance / level Sets/sec Reps Notes / Cues   See HEP below X12'             ASSESS pelvic alignment next session                                                        Therapeutic Activities (67144)       4/13/22 Pt was educated on PT POC, Diagnosis, Prognosis, pathomechanics as well as frequency and duration of scheduling future physical therapy appointments. Time was also taken on this day to answer all patient questions and participation in [facility] advising of purchasing rollator for safety                     Neuromuscular Re-ed (97971)                            Manual Intervention ([phone])       Knee mobs/PROM       Tib/Fem Mobs       Patella Mobs       Ankle mobs                         Modalities:     Pt. Education:  -pt educated on diagnosis, prognosis and expectations for rehab  -all pt questions were answered    Home Exercise Program:  Access Code: [email or web address]  URL: [email or web address]  Date: 04/13/2022  Prepared by:  [facility]    Exercises  Modified Daryl Stretch - 1 x daily - 7 x weekly - 1 sets - 3 reps - 20\" hold  Seated Hamstring Stretch - 1 x daily - 7 x weekly - 1 sets - 3 reps - 20\" hold  Seated Gastroc Stretch with Strap - 1 x daily - 7 x weekly - 1 sets - 3 reps - 20\" hold  Standing Gastroc Stretch - 1 x daily - 7 x weekly - 3 sets - 10 reps  Bent Knee Fallouts - 1 x daily - 7 x weekly - 2 sets - 10 reps  Supine Gluteal Sets - 1 x daily - 7 x weekly - 3 sets - 10 reps - 5\" hold  Supine Quadricep Sets - 1 x daily - 7 x weekly - 1-2 sets - 10 reps - 5\" hold    Therapeutic Exercise and NMR EXR  [x] (64464) Provided verbal/tactile cueing for activities related to strengthening, flexibility, endurance, ROM for improvements in LE, proximal hip, and core control with self care, mobility, lifting, ambulation.  [] (13569) Provided verbal/tactile cueing for activities related to improving balance, coordination, kinesthetic sense, posture, motor skill, proprioception  to assist with LE, proximal hip, and core control in self care, mobility, lifting, ambulation and eccentric single leg control.   [] (22487) Therapist is in constant attendance of 2 or more patients providing skilled therapy interventions, but not providing any significant amount of measurable one-on-one time to either patient, for improvements in LE, proximal hip, and core control in self care, mobility, lifting, ambulation and eccentric single leg control. NMR and Therapeutic Activities:    [x] (33522 or 88850) Provided verbal/tactile cueing for activities related to improving balance, coordination, kinesthetic sense, posture, motor skill, proprioception and motor activation to allow for proper function of core, proximal hip and LE with self care and ADLs  [] (66257) Gait Re-education- Provided training and instruction to the patient for proper LE, core and proximal hip recruitment and positioning and eccentric body weight control with ambulation re-education including up and down stairs     Home Exercise Program:    [x] (39186) Reviewed/Progressed HEP activities related to strengthening, flexibility, endurance, ROM of core, proximal hip and LE for functional self-care, mobility, lifting and ambulation/stair navigation   [] (96950)Reviewed/Progressed HEP activities related to improving balance, coordination, kinesthetic sense, posture, motor skill, proprioception of core, proximal hip and LE for self care, mobility, lifting, and ambulation/stair navigation      Manual Treatments:  PROM / STM / Oscillations-Mobs:  G-I, II, III, IV (PA's, Inf., Post.)  [] (34526) Provided manual therapy to mobilize LE, proximal hip and/or LS spine soft tissue/joints for the purpose of modulating pain, promoting relaxation,  increasing ROM, reducing/eliminating soft tissue swelling/inflammation/restriction, improving soft tissue extensibility and allowing for proper ROM for normal function with self care, mobility, lifting and ambulation.      Modalities:  [] (54895) Vasopneumatic compression: Utilized vasopneumatic compression to decrease edema / swelling for the purpose of improving mobility and quad tone / recruitment which will allow for increased overall function including but not limited to self-care, transfers, ambulation, and ascending / descending stairs. Charges:  Timed Code Treatment Minutes: 25   Total Treatment Minutes: 50     [x] EVAL - LOW (52410)   [] EVAL - MOD (99648)  [] EVAL - HIGH (44408)  [] RE-EVAL (16738)  [x] LG(95577) x 1      [] Ionto  [] NMR (24386) x       [] Vaso  [] Manual (31726) x       [] Ultrasound  [x] TA x   1     [] Mech Traction (34174)  [] Aquatic Therapy x     [] ES (un) (51054):   [] Home Management Training x  [] ES(attended) (40909)   [] Dry Needling 1-2 muscles (00313):  [] Dry Needling 3+ muscles (610597  [] Group:      [] Other:     GOALS:   Patient stated goal:to be able to walk better  [] Progressing: [] Met: [] Not Met: [] Adjusted    Therapist goals for Patient:   Short Term Goals: To be achieved in: 2 weeks  1. Independent in HEP and progression per patient tolerance, in order to prevent re-injury. [] Progressing: [] Met: [] Not Met: [] Adjusted  2. Patient will have a decrease in pain to facilitate improvement in movement, function, and ADLs as indicated by Functional Deficits. [] Progressing: [] Met: [] Not Met: [] Adjusted    Long Term Goals: To be achieved in: 4-6 weeks  1. FOTO score of at least 58 to assist with reaching prior level of function. [] Progressing: [] Met: [] Not Met: [] Adjusted  2. Patient will demonstrate increased AROM to 0-116 degrees in B knees to allow for proper joint functioning as indicated by patients ability to perform sit to stand with improved quad activation/knee flexion with UE A,  [] Progressing: [] Met: [] Not Met: [] Adjusted  3. Patient will demonstrate an increase in Strength to at least 4-4.5/5 as well as good proximal hip strength and control to allow for proper functional mobility as indicated by patients ability to ascend 4-6 steps reciprocally with 1 rail by discharge  [] Progressing: [] Met: [] Not Met: [] Adjusted  4.  Patient will return to functional activities including standing at [place] for up to 15-20; without increased symptoms or restriction. [] Progressing: [] Met: [] Not Met: [] Adjusted  5. Patient to be able to perform TUG in 12 seconds or less for improved safety with community ambulation. [] Progressing: [] Met: [] Not Met: [] Adjusted       Overall Progression Towards Functional goals/ Treatment Progress Update:  [] Patient is progressing as expected towards functional goals listed. [] Progression is slowed due to complexities/Impairments listed. [] Progression has been slowed due to co-morbidities. [x] Plan just implemented, too soon to assess goals progression <30days   [] Goals require adjustment due to lack of progress  [] Patient is not progressing as expected and requires additional follow up with physician  [] Other    Persisting Functional Limitations/Impairments:  []Sitting []Standing   []Walking []Stairs   []Transfers []ADLs   []Squatting/bending []Kneeling  []Housework []Job related tasks  []Driving []Sports/Recreation   []Sleeping []Other:    ASSESSMENT:  See eval  Treatment/Activity Tolerance:  [x] Pt able to complete treatment [] Patient limited by fatique  [x] Patient limited by pain  [] Patient limited by other medical complications  [] Other:     Prognosis:  [] Good [x] Fair  [] Poor    Patient Requires Follow-up: [x] Yes  [] No    Return to Play:    [x]  N/A   []  Stage 1: Intro to Strength   []  Stage 2: Return to Run and Strength   []  Stage 3: Return to Jump and Strength   []  Stage 4: Dynamic Strength and Agility   []  Stage 5: Sport Specific Training     []  Ready to Return to Play, Meets All Above Stages   []  Not Ready for Return to Sports   Comments:            PLAN: See eval. PT 1-2x / week for 4-6 weeks.    [] Continue per plan of care [] Alter current plan (see comments)  [x] Plan of care initiated [] Hold pending MD visit [] Discharge    Electronically signed by: Shantanu Hall PT, MSPT      Note: If patient does not return for scheduled/ recommended follow up visits, this note will serve as a discharge from care along with most recent update on progress. 97.6

## 2024-02-16 ENCOUNTER — APPOINTMENT (OUTPATIENT)
Dept: ORTHOPEDIC SURGERY | Facility: CLINIC | Age: 69
End: 2024-02-16
Payer: MEDICARE

## 2024-02-16 PROBLEM — Z87.19 PERSONAL HISTORY OF OTHER DISEASES OF THE DIGESTIVE SYSTEM: Chronic | Status: ACTIVE | Noted: 2024-01-30

## 2024-02-16 PROBLEM — E04.1 NONTOXIC SINGLE THYROID NODULE: Chronic | Status: ACTIVE | Noted: 2024-01-30

## 2024-02-16 PROBLEM — Z87.898 PERSONAL HISTORY OF OTHER SPECIFIED CONDITIONS: Chronic | Status: ACTIVE | Noted: 2024-01-30

## 2024-02-16 PROBLEM — E66.9 OBESITY, UNSPECIFIED: Chronic | Status: ACTIVE | Noted: 2024-01-30

## 2024-02-16 PROBLEM — G56.01 CARPAL TUNNEL SYNDROME, RIGHT UPPER LIMB: Chronic | Status: ACTIVE | Noted: 2024-01-30

## 2024-02-16 PROBLEM — I78.1 NEVUS, NON-NEOPLASTIC: Chronic | Status: ACTIVE | Noted: 2024-01-30

## 2024-02-16 PROBLEM — N85.00 ENDOMETRIAL HYPERPLASIA, UNSPECIFIED: Chronic | Status: ACTIVE | Noted: 2024-01-30

## 2024-02-16 PROBLEM — E78.5 HYPERLIPIDEMIA, UNSPECIFIED: Chronic | Status: ACTIVE | Noted: 2024-01-30

## 2024-02-16 PROBLEM — I10 ESSENTIAL (PRIMARY) HYPERTENSION: Chronic | Status: ACTIVE | Noted: 2024-01-30

## 2024-02-16 PROBLEM — Z86.79 PERSONAL HISTORY OF OTHER DISEASES OF THE CIRCULATORY SYSTEM: Chronic | Status: ACTIVE | Noted: 2024-01-30

## 2024-02-16 PROBLEM — Z92.29 PERSONAL HISTORY OF OTHER DRUG THERAPY: Chronic | Status: ACTIVE | Noted: 2024-01-30

## 2024-02-16 PROBLEM — M50.20 OTHER CERVICAL DISC DISPLACEMENT, UNSPECIFIED CERVICAL REGION: Chronic | Status: ACTIVE | Noted: 2024-01-30

## 2024-02-16 PROBLEM — Z87.2 PERSONAL HISTORY OF DISEASES OF THE SKIN AND SUBCUTANEOUS TISSUE: Chronic | Status: ACTIVE | Noted: 2024-01-30

## 2024-02-16 PROBLEM — N84.0 POLYP OF CORPUS UTERI: Chronic | Status: ACTIVE | Noted: 2024-01-30

## 2024-02-16 PROCEDURE — 99024 POSTOP FOLLOW-UP VISIT: CPT

## 2024-02-16 NOTE — END OF VISIT
[FreeTextEntry3] : This note was written by Jin Valdovinos on 02/16/2024 acting solely as a scribe for Dr. Bubba Esquivel.   All medical record entries made by the Scribe were at my, Dr. Bubba Esquivel, direction and personally dictated by me on 02/16/2024. I have personally reviewed the chart and agree that the record accurately reflects my personal performance of the history, physical exam, assessment and plan.

## 2024-02-16 NOTE — ADDENDUM
[FreeTextEntry1] :  I, Jin Valdovinos, acted solely as a scribe for Dr. Esquivel on this date on 02/16/2024.

## 2024-02-16 NOTE — HISTORY OF PRESENT ILLNESS
[de-identified] : 3 days postoperative. [de-identified] : 3 days status post endoscopic right carpal tunnel release and flexor tenosynovectomy.  Date of surgery: 2/13/2024.  She is overall doing well today. She states that her numbness has slightly improved since having the surgery.   She is accompanied by her .  [de-identified] : Examination of her right wrist and hand after the dressing was removed demonstrates her incision to be clean and dry.  There is no drainage or evidence of infection.  There is some swelling and ecchymosis.  She has full flexion extension of the digits.  She has intact sensation to light touch distally along the radial, ulnar and median nerve distributions. [de-identified] : Stable, 3 days postoperative. [de-identified] : The suture ends were cut and Steri-Strips were applied.  She was instructed on local wound care, when to begin scar massage and desensitization, range of motion exercises and will followup in 3-4 weeks.  The patient was instructed to return before then or to call the office if there are any problems in the interim.

## 2024-03-04 ENCOUNTER — NON-APPOINTMENT (OUTPATIENT)
Age: 69
End: 2024-03-04

## 2024-03-15 ENCOUNTER — APPOINTMENT (OUTPATIENT)
Dept: ORTHOPEDIC SURGERY | Facility: CLINIC | Age: 69
End: 2024-03-15
Payer: MEDICARE

## 2024-03-15 PROCEDURE — 73130 X-RAY EXAM OF HAND: CPT | Mod: RT

## 2024-03-15 PROCEDURE — 99024 POSTOP FOLLOW-UP VISIT: CPT

## 2024-03-15 PROCEDURE — 73110 X-RAY EXAM OF WRIST: CPT | Mod: RT

## 2024-03-15 RX ORDER — METHYLPREDNISOLONE 4 MG/1
4 TABLET ORAL
Qty: 21 | Refills: 0 | Status: ACTIVE | COMMUNITY
Start: 2024-03-15 | End: 1900-01-01

## 2024-03-15 NOTE — HISTORY OF PRESENT ILLNESS
[de-identified] : 31 days postoperative. [de-identified] : 31 days status post endoscopic right carpal tunnel release and flexor tenosynovectomy.  Date of surgery: 2/13/2024.  She is in pain and states that it has worsen over the last 3 days. She is taking advil for the pain. She rates her pain as a 9/10. She has difficulty gripping objects. She has swelling, occasional radiating pain and tingling in her fingers with activity. She states that her numbness and tingling is the same since having the surgery.   She is accompanied by her .  [de-identified] : Examination of her right wrist and hand demonstrates her incision to be healing well.  There is increased swelling along the incision palmarly.  There are no signs of infection.  She has some limitation terminal flexion and extension.  She has complaints of numbness and tingling throughout the digits.  She has swelling dorsally and is quite tender dorsally overlying the STT joint. 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  [de-identified] : 31 days postoperative, with recent increase pain and swelling along the incision.  She also has evidence of severe STT joint arthritis and she is symptomatic in this region. [de-identified] : I discussed with her that she appears to have a inflammatory flare response.  I also told her that she has severe STT joint arthritis and this has been flared up, even though it was previously asymptomatic.  I recommended being immobilized in a carpal tunnel splint. She was provided with a right carpal tunnel splint today. I also recommend for her to begin a course of a Medrol dose pack, along with icing.  She was warned about potential side effects for the Medrol Dosepak, including GI.  She will follow-up in 2 weeks. If her symptoms do worsen, she was advised to call the office.

## 2024-03-15 NOTE — END OF VISIT
[FreeTextEntry3] : This note was written by Jin Valdovinos on 03/15/2024 acting solely as a scribe for Dr. Bubba Esquivel.   All medical record entries made by the Scribe were at my, Dr. Bubba Esquivel, direction and personally dictated by me on 03/15/2024. I have personally reviewed the chart and agree that the record accurately reflects my personal performance of the history, physical exam, assessment and plan.

## 2024-03-15 NOTE — ADDENDUM
[FreeTextEntry1] :  I, Jin Valdovinos, acted solely as a scribe for Dr. Esquivel on this date on 03/15/2024.

## 2024-03-17 ENCOUNTER — NON-APPOINTMENT (OUTPATIENT)
Age: 69
End: 2024-03-17

## 2024-03-27 ENCOUNTER — TRANSCRIPTION ENCOUNTER (OUTPATIENT)
Age: 69
End: 2024-03-27

## 2024-03-27 ENCOUNTER — APPOINTMENT (OUTPATIENT)
Dept: ORTHOPEDIC SURGERY | Facility: CLINIC | Age: 69
End: 2024-03-27
Payer: MEDICARE

## 2024-03-27 PROCEDURE — 99024 POSTOP FOLLOW-UP VISIT: CPT

## 2024-03-27 NOTE — HISTORY OF PRESENT ILLNESS
[de-identified] : 43 days postoperative. [de-identified] : Examination of her right wrist and hand demonstrates her incision to be healing well.  There is residual although decreased swelling along the incision palmarly.  She has regained full flexion and extension of the digits.  There is no residual swelling or tenderness dorsally overlying the STT joint.  She has complaints of some residual tingling along the median nerve distribution with normal sensation to light touch throughout the digits. [de-identified] : 43 days status post endoscopic right carpal tunnel release and flexor tenosynovectomy.  Date of surgery: 2/13/2024.  See note from when she was seen in the office 12 days ago.  She was fitted with a carpal tunnel splint and prescribed a Medrol Dosepak.  She is overall doing well today. She still has some swelling, along with tingling. She states that her tingling feeling is the same since having the surgery.  [de-identified] : At this time, I recommended range of motion exercises and hand therapy. She was given a referral for hand therapy. She will follow up in 3 weeks.  If she notices increased pain, swelling or other concerns before then, then she will return to the office earlier. [de-identified] : 43 days postoperative, with clinical improvement. 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

## 2024-03-27 NOTE — ADDENDUM
[FreeTextEntry1] :  I, Jin Valdovinos, acted solely as a scribe for Dr. Esquivel on this date on 03/27/2024.

## 2024-03-27 NOTE — END OF VISIT
[FreeTextEntry3] : This note was written by Jin Valdovinos on 03/27/2024 acting solely as a scribe for Dr. Bubba Esquivel.   All medical record entries made by the Scribe were at my, Dr. Bubba Esquivel, direction and personally dictated by me on 03/27/2024. I have personally reviewed the chart and agree that the record accurately reflects my personal performance of the history, physical exam, assessment and plan.

## 2024-04-11 ENCOUNTER — NON-APPOINTMENT (OUTPATIENT)
Age: 69
End: 2024-04-11

## 2024-04-24 ENCOUNTER — APPOINTMENT (OUTPATIENT)
Dept: ORTHOPEDIC SURGERY | Facility: CLINIC | Age: 69
End: 2024-04-24
Payer: MEDICARE

## 2024-04-24 PROCEDURE — 99024 POSTOP FOLLOW-UP VISIT: CPT

## 2024-04-24 NOTE — HISTORY OF PRESENT ILLNESS
[de-identified] : 71 days postoperative. [de-identified] : 71 days status post endoscopic right carpal tunnel release and flexor tenosynovectomy.  Date of surgery: 2/13/2024.  She is overall doing well today. She has numbness/tingling, swelling and states that she is starting physical therapy on 4/30/2024. She states that her numbness and tingling is the same since having the surgery.  [de-identified] : Examination of her right wrist and hand demonstrates her incision to be healing well.  There is residual although decreased swelling along the incision palmarly.  She has regained full flexion and extension of the digits.  There is no residual swelling or tenderness dorsally overlying the STT joint.  She has complaints of some residual numbness and tingling, but has intact sensation to light touch distally along the radial, ulnar and median nerve distributions. 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  [de-identified] : 71 days postoperative, with clinical improvement, and decreased swelling, with some residual symptoms and numbness and tingling. [de-identified] : She was instructed on beginning hand therapy, which she is scheduled for on 4/30/2024, and a home exercise program.  She will follow-up in 4 weeks.  She was instructed to call the office before then if she is having any problems or concerns.

## 2024-07-02 PROBLEM — G56.02 CARPAL TUNNEL SYNDROME OF LEFT WRIST: Status: ACTIVE | Noted: 2022-07-28

## 2024-07-02 PROBLEM — M65.9 TENOSYNOVITIS OF RIGHT WRIST: Status: ACTIVE | Noted: 2024-02-13

## 2024-07-02 PROBLEM — M19.031 ARTHRITIS OF RIGHT WRIST: Status: ACTIVE | Noted: 2024-03-15

## 2024-07-02 PROBLEM — G56.01 CARPAL TUNNEL SYNDROME OF RIGHT WRIST: Status: ACTIVE | Noted: 2022-07-28

## 2024-07-08 ENCOUNTER — APPOINTMENT (OUTPATIENT)
Dept: ORTHOPEDIC SURGERY | Facility: CLINIC | Age: 69
End: 2024-07-08
Payer: MEDICARE

## 2024-07-08 DIAGNOSIS — M19.031 PRIMARY OSTEOARTHRITIS, RIGHT WRIST: ICD-10-CM

## 2024-07-08 DIAGNOSIS — M65.9 SYNOVITIS AND TENOSYNOVITIS, UNSPECIFIED: ICD-10-CM

## 2024-07-08 DIAGNOSIS — G56.02 CARPAL TUNNEL SYNDROME, LEFT UPPER LIMB: ICD-10-CM

## 2024-07-08 DIAGNOSIS — G56.01 CARPAL TUNNEL SYNDROME, RIGHT UPPER LIMB: ICD-10-CM

## 2024-07-08 PROCEDURE — 99213 OFFICE O/P EST LOW 20 MIN: CPT

## 2024-11-14 NOTE — ED ADULT NURSE NOTE - BREATHING, MLM
Per patient, no changes to health history or to medications since the last cataract surgery on 10/31/24.     Nothing to eat or drink starting 8 hours prior to your scheduled arrival time.     Wear comfortable loose fitting clothing    Leave any valuables at home or with support person, including wallet and jewelry     Remove all jewelry and piercings, wedding bands included    Bring photo ID, insurance card and copay if needed.    Bathe or shower noc prior or am of surgery, no lotion, dry hair, no contacts and no deodorant.    Please bring a pair of sunglasses your eye will be dilated.     Please take the following medications with a sip of water;  enalapril, lansoprazole.    Do not take your diuretic or diabetic medications in the AM.    If surgery is in the morning take 1/2 of AM (or PM) dose of long acting insulin if BS  >100.  If BS < 100, do not take.    If surgery is in the afternoon, take 1/3 of AM (or PM) dose of long acting insulin if BS >100. If BS <100, do not take.     Arrival time will be given 11/19 (2 business days prior to surgery) in the AFTERNOON     Disregard any text, automatic calls, or [employer] rica times if given    [address] in [place] - come in through the first door and the surgery center door is immediately on your left - then proceed downstairs to check in.      Have an adult drive you to and from procedure and have someone at home with you for the first 24 hours post op.     
Spontaneous, unlabored and symmetrical

## (undated) DEVICE — SLING ARM CHIEFTAIN MESH LARGE

## (undated) DEVICE — TOURNIQUET CUFF 18" DUAL PORT SINGLE BLADDER W PLC  (BLACK)

## (undated) DEVICE — DRSG KLING 3"

## (undated) DEVICE — SLING ARM CHIEFTAIN MESH MEDIUM

## (undated) DEVICE — GLV 7 PROTEXIS (WHITE)

## (undated) DEVICE — DRSG KLING 4"

## (undated) DEVICE — DRAPE TOWEL BLUE 17" X 24"

## (undated) DEVICE — DRSG STERISTRIPS 0.5 X 4"

## (undated) DEVICE — PACK UPPER EXTREMITY

## (undated) DEVICE — SYS ENDO STRATOS RELEASE 30 DEG 4MM

## (undated) DEVICE — VENODYNE/SCD SLEEVE CALF MEDIUM

## (undated) DEVICE — DRAPE 3/4 SHEET 52X76"

## (undated) DEVICE — WARMING BLANKET LOWER ADULT

## (undated) DEVICE — SPECIMEN CONTAINER PET

## (undated) DEVICE — SUT MONOCRYL 5-0 18" P-3 UNDYED

## (undated) DEVICE — TOURNIQUET ESMARK 4"

## (undated) DEVICE — SYM-TOURNIQUET 3013LAAP: Type: DURABLE MEDICAL EQUIPMENT

## (undated) DEVICE — DRSG WEBRIL 3"

## (undated) DEVICE — POSITIONER FOAM HEAD CRADLE (PINK)

## (undated) DEVICE — POSITIONER STRAP ARMBOARD VELCRO TS-30

## (undated) DEVICE — NDL HYPO REGULAR BEVEL 25G X 1.5" (BLUE)

## (undated) DEVICE — GLV 7.5 PROTEXIS (BLUE)